# Patient Record
Sex: MALE | URBAN - METROPOLITAN AREA
[De-identification: names, ages, dates, MRNs, and addresses within clinical notes are randomized per-mention and may not be internally consistent; named-entity substitution may affect disease eponyms.]

---

## 2019-09-09 ENCOUNTER — OFFICE VISIT (OUTPATIENT)
Dept: URBAN - METROPOLITAN AREA CLINIC 22 | Facility: CLINIC | Age: 74
End: 2019-09-09
Payer: MEDICARE

## 2019-09-09 DIAGNOSIS — H25.13 AGE-RELATED NUCLEAR CATARACT, BILATERAL: Primary | ICD-10-CM

## 2019-09-09 DIAGNOSIS — E11.9 TYPE 2 DIABETES MELLITUS W/O COMPLICATION: ICD-10-CM

## 2019-09-09 DIAGNOSIS — H02.112 CICATRICIAL ECTROPION OF RIGHT LOWER EYELID: ICD-10-CM

## 2019-09-09 DIAGNOSIS — H52.4 PRESBYOPIA: ICD-10-CM

## 2019-09-09 PROCEDURE — 92004 COMPRE OPH EXAM NEW PT 1/>: CPT | Performed by: OPTOMETRIST

## 2019-09-09 PROCEDURE — 92015 DETERMINE REFRACTIVE STATE: CPT | Performed by: OPTOMETRIST

## 2019-09-09 ASSESSMENT — VISUAL ACUITY
OS: 20/40
OD: 20/40

## 2019-09-09 ASSESSMENT — INTRAOCULAR PRESSURE
OD: 13
OS: 18

## 2019-09-09 NOTE — IMPRESSION/PLAN
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 Plan: Discussed diagnosis in detail with patient. Asked pt to consider sx to improve eye condition and stop the watering.

## 2019-09-09 NOTE — IMPRESSION/PLAN
Impression: Type 2 diabetes mellitus w/o complication: B94.5. OU. Plan: Recommend yearly diabetic eye exam. Discussed with patient importance of good blood sugar control with regular visits with PCP, compliance with medications, healthy diet and daily exercise.

## 2019-09-09 NOTE — IMPRESSION/PLAN
Impression: Age-related nuclear cataract, bilateral: H25.13 OU. Plan: Discussed risks and benefits of cataract surgery with patient today. Patient will continue to monitor at this time  any vision changes or increase in glare symptoms. Patient will be seen in 1 year for CEE unless notices vision changes sooner. Pt edu about surgery once best corrected is worse than 20/40.

## 2019-10-02 ENCOUNTER — OFFICE VISIT (OUTPATIENT)
Dept: URBAN - METROPOLITAN AREA CLINIC 23 | Facility: CLINIC | Age: 74
End: 2019-10-02
Payer: MEDICARE

## 2019-10-02 DIAGNOSIS — H02.022 MECHANICAL ENTROPION OF RIGHT LOWER EYELID: ICD-10-CM

## 2019-10-02 DIAGNOSIS — H02.025 MECHANICAL ENTROPION OF LEFT LOWER EYELID: ICD-10-CM

## 2019-10-02 DIAGNOSIS — H25.813 COMBINED FORMS OF AGE-RELATED CATARACT, BILATERAL: Primary | ICD-10-CM

## 2019-10-02 PROCEDURE — 92004 COMPRE OPH EXAM NEW PT 1/>: CPT | Performed by: OPHTHALMOLOGY

## 2019-10-02 ASSESSMENT — INTRAOCULAR PRESSURE
OS: 17
OD: 14

## 2019-10-02 ASSESSMENT — VISUAL ACUITY
OD: 20/40
OS: 20/40

## 2019-10-02 NOTE — IMPRESSION/PLAN
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 Plan: Discussed diagnosis in detail with patient. Advised patient of condition. Consider consult after cataract surgery [[facility]].

## 2019-10-02 NOTE — IMPRESSION/PLAN
Impression: Combined forms of age-related cataract, bilateral: H25.813. Symptoms: could improve with surgery. Condition: established, worsening. Vision: vision affected. Plan: Cataract accounts for patient's complaints. Reviewed risks, benefits, and procedure. Patient desires surgery, schedule ce/iol OD then OS, RL2, discussed lens options, distance refractive target, patient is clear for surgery in [place] [id number].

## 2019-10-21 ENCOUNTER — PRE-OPERATIVE VISIT (OUTPATIENT)
Dept: URBAN - METROPOLITAN AREA CLINIC 23 | Facility: CLINIC | Age: 74
End: 2019-10-21
Payer: MEDICARE

## 2019-10-21 PROCEDURE — 92136 OPHTHALMIC BIOMETRY: CPT | Performed by: OPHTHALMOLOGY

## 2019-10-21 ASSESSMENT — PACHYMETRY
OS: 3.17
OS: 24.43
OD: 3.17
OD: 24.67

## 2019-11-06 ENCOUNTER — SURGERY (OUTPATIENT)
Dept: URBAN - METROPOLITAN AREA SURGERY 11 | Facility: SURGERY | Age: 74
End: 2019-11-06
Payer: MEDICARE

## 2019-11-06 PROCEDURE — 66984 XCAPSL CTRC RMVL W/O ECP: CPT | Performed by: OPHTHALMOLOGY

## 2019-11-07 ENCOUNTER — POST-OPERATIVE VISIT (OUTPATIENT)
Dept: URBAN - METROPOLITAN AREA CLINIC 23 | Facility: CLINIC | Age: 74
End: 2019-11-07

## 2019-11-07 DIAGNOSIS — Z09 ENCNTR FOR F/U EXAM AFT TRTMT FOR COND OTH THAN MALIG NEOPLM: Primary | ICD-10-CM

## 2019-11-07 PROCEDURE — 99024 POSTOP FOLLOW-UP VISIT: CPT | Performed by: OPTOMETRIST

## 2019-11-07 ASSESSMENT — INTRAOCULAR PRESSURE
OS: 20
OD: 20

## 2019-11-18 ENCOUNTER — POST-OPERATIVE VISIT (OUTPATIENT)
Dept: URBAN - METROPOLITAN AREA CLINIC 23 | Facility: CLINIC | Age: 74
End: 2019-11-18

## 2019-11-18 PROCEDURE — 99024 POSTOP FOLLOW-UP VISIT: CPT | Performed by: OPTOMETRIST

## 2019-11-18 ASSESSMENT — INTRAOCULAR PRESSURE
OS: 14
OD: 14

## 2019-11-18 ASSESSMENT — VISUAL ACUITY
OS: UNABLE
OD: 20/40

## 2021-02-25 ENCOUNTER — OFFICE VISIT (OUTPATIENT)
Dept: URBAN - METROPOLITAN AREA CLINIC 22 | Facility: CLINIC | Age: 76
End: 2021-02-25
Payer: MEDICARE

## 2021-02-25 PROCEDURE — 99213 OFFICE O/P EST LOW 20 MIN: CPT | Performed by: OPTOMETRIST

## 2021-02-25 RX ORDER — NEOMYCIN SULFATE, POLYMYXIN B SULFATE AND DEXAMETHASONE 3.5; 10000; 1 MG/G; [USP'U]/G; MG/G
OINTMENT OPHTHALMIC
Qty: 3.5 | Refills: 0 | Status: INACTIVE
Start: 2021-02-25 | End: 2021-05-11

## 2021-02-25 ASSESSMENT — INTRAOCULAR PRESSURE
OD: 11
OS: 11

## 2021-02-25 NOTE — IMPRESSION/PLAN
Impression: Combined forms of age-related cataract, bilateral: H25.813.  Bilateral. Plan: refer for cat sx after seen by Dr. Cesar Porter

## 2021-02-25 NOTE — IMPRESSION/PLAN
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 Right.  Plan: start pt on maxitrol ointment until seen by Dr. Jordan Eller

## 2021-05-11 ENCOUNTER — OFFICE VISIT (OUTPATIENT)
Dept: URBAN - METROPOLITAN AREA CLINIC 23 | Facility: CLINIC | Age: 76
End: 2021-05-11
Payer: MEDICARE

## 2021-05-11 DIAGNOSIS — H02.032 SENILE ENTROPION OF RIGHT LOWER EYELID: Primary | ICD-10-CM

## 2021-05-11 PROCEDURE — 99214 OFFICE O/P EST MOD 30 MIN: CPT | Performed by: OPHTHALMOLOGY

## 2021-05-11 RX ORDER — CEPHALEXIN 750 MG/1
750 MG CAPSULE ORAL
Qty: 20 | Refills: 0 | Status: ACTIVE
Start: 2021-05-11

## 2021-05-11 RX ORDER — ERYTHROMYCIN 5 MG/G
OINTMENT OPHTHALMIC
Qty: 2 | Refills: 0 | Status: ACTIVE
Start: 2021-05-11

## 2021-05-11 ASSESSMENT — INTRAOCULAR PRESSURE
OD: 19
OS: 18

## 2021-05-11 NOTE — IMPRESSION/PLAN
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 Photo Interpretation: supports clinical findings and dx documented in chart. Plan: Discussed diagnosis in detail with patient. Discussed treatment options with patient. Surgical risks and benefits were discussed, explained and understood by patient. Surgical treatment is required. Patient elects to proceed with surgery. Recommend Right Lower Eyelid Entropion Repair. Patient understands in order to proceed with surgical intervention he will be required to cut smoking to 1/2 a pack of cigarettes a day. Patient understands that he will not be able to proceed with surgery if he does not reduce smoking. RL2.

*Patient is currently being treated during COVID-19 epidemic, which limits our availability to establish proper follow up care. Patient understands these limitations, and is aware that we will continue treatment and follow up based on our availability, as determined by local state and federal guidelines. *Patient is currently being treated during COVID-19 epidemic, which limits our availability to establish proper follow up care. Patient understands these limitations, and is aware that we will continue treatment and follow up based on our availability, as determined by local state and federal guidelines.